# Patient Record
Sex: FEMALE | Race: WHITE | URBAN - METROPOLITAN AREA
[De-identification: names, ages, dates, MRNs, and addresses within clinical notes are randomized per-mention and may not be internally consistent; named-entity substitution may affect disease eponyms.]

---

## 2024-03-06 ENCOUNTER — LAB REQUISITION (OUTPATIENT)
Dept: LAB | Facility: CLINIC | Age: 64
End: 2024-03-06
Payer: COMMERCIAL

## 2024-03-06 DIAGNOSIS — Z11.1 ENCOUNTER FOR SCREENING FOR RESPIRATORY TUBERCULOSIS: ICD-10-CM

## 2024-03-07 ENCOUNTER — LAB REQUISITION (OUTPATIENT)
Dept: LAB | Facility: CLINIC | Age: 64
End: 2024-03-07
Payer: COMMERCIAL

## 2024-03-07 ENCOUNTER — TRANSITIONAL CARE UNIT VISIT (OUTPATIENT)
Dept: GERIATRICS | Facility: CLINIC | Age: 64
End: 2024-03-07
Payer: COMMERCIAL

## 2024-03-07 ENCOUNTER — TELEPHONE (OUTPATIENT)
Dept: GERIATRICS | Facility: CLINIC | Age: 64
End: 2024-03-07

## 2024-03-07 ENCOUNTER — DOCUMENTATION ONLY (OUTPATIENT)
Dept: GERIATRICS | Facility: CLINIC | Age: 64
End: 2024-03-07
Payer: COMMERCIAL

## 2024-03-07 VITALS
DIASTOLIC BLOOD PRESSURE: 65 MMHG | OXYGEN SATURATION: 96 % | SYSTOLIC BLOOD PRESSURE: 139 MMHG | RESPIRATION RATE: 22 BRPM | TEMPERATURE: 97.8 F | HEART RATE: 63 BPM | WEIGHT: 293 LBS | BODY MASS INDEX: 53.92 KG/M2 | HEIGHT: 62 IN

## 2024-03-07 DIAGNOSIS — E66.01 MORBID OBESITY (H): ICD-10-CM

## 2024-03-07 DIAGNOSIS — D62 ANEMIA DUE TO BLOOD LOSS, ACUTE: ICD-10-CM

## 2024-03-07 DIAGNOSIS — D64.9 ANEMIA, UNSPECIFIED: ICD-10-CM

## 2024-03-07 DIAGNOSIS — I10 PRIMARY HYPERTENSION: ICD-10-CM

## 2024-03-07 DIAGNOSIS — M81.0 AGE-RELATED OSTEOPOROSIS WITHOUT CURRENT PATHOLOGICAL FRACTURE: ICD-10-CM

## 2024-03-07 DIAGNOSIS — I27.82 OTHER CHRONIC PULMONARY EMBOLISM, UNSPECIFIED WHETHER ACUTE COR PULMONALE PRESENT (H): ICD-10-CM

## 2024-03-07 DIAGNOSIS — I89.0 LYMPHEDEMA: ICD-10-CM

## 2024-03-07 DIAGNOSIS — S81.002S OPEN WOUND OF LEFT KNEE, SEQUELA: Primary | ICD-10-CM

## 2024-03-07 DIAGNOSIS — E03.9 HYPOTHYROIDISM, UNSPECIFIED TYPE: ICD-10-CM

## 2024-03-07 DIAGNOSIS — Z86.718 HISTORY OF DVT (DEEP VEIN THROMBOSIS): ICD-10-CM

## 2024-03-07 DIAGNOSIS — I10 ESSENTIAL (PRIMARY) HYPERTENSION: ICD-10-CM

## 2024-03-07 DIAGNOSIS — R53.81 PHYSICAL DECONDITIONING: ICD-10-CM

## 2024-03-07 PROBLEM — M25.561 BILATERAL CHRONIC KNEE PAIN: Status: ACTIVE | Noted: 2021-08-22

## 2024-03-07 PROBLEM — R06.83 SNORING: Status: ACTIVE | Noted: 2023-08-20

## 2024-03-07 PROBLEM — M25.562 BILATERAL CHRONIC KNEE PAIN: Status: ACTIVE | Noted: 2021-08-22

## 2024-03-07 PROBLEM — M79.641 BILATERAL HAND PAIN: Status: ACTIVE | Noted: 2019-08-20

## 2024-03-07 PROBLEM — H43.813 VITREOUS DETACHMENT OF BOTH EYES: Status: ACTIVE | Noted: 2019-10-07

## 2024-03-07 PROBLEM — G89.29 BILATERAL CHRONIC KNEE PAIN: Status: ACTIVE | Noted: 2021-08-22

## 2024-03-07 PROBLEM — M65.30 TRIGGER FINGER OF RIGHT HAND: Status: ACTIVE | Noted: 2023-03-17

## 2024-03-07 PROBLEM — M62.838 SPASM OF MUSCLE: Status: ACTIVE | Noted: 2021-08-22

## 2024-03-07 PROBLEM — M79.642 BILATERAL HAND PAIN: Status: ACTIVE | Noted: 2019-08-20

## 2024-03-07 PROBLEM — R31.9 HEMATURIA: Status: ACTIVE | Noted: 2023-07-05

## 2024-03-07 PROBLEM — Z64.1 MULTIPARITY: Status: ACTIVE | Noted: 2024-03-07

## 2024-03-07 PROBLEM — Z79.01 LONG TERM CURRENT USE OF ANTICOAGULANT: Status: ACTIVE | Noted: 2023-07-24

## 2024-03-07 PROBLEM — I26.02 ACUTE SADDLE PULMONARY EMBOLISM WITH ACUTE COR PULMONALE (H): Status: ACTIVE | Noted: 2023-06-07

## 2024-03-07 PROBLEM — N17.9 AKI (ACUTE KIDNEY INJURY) (H): Status: ACTIVE | Noted: 2023-06-08

## 2024-03-07 PROBLEM — S81.002A OPEN KNEE WOUND, LEFT, INITIAL ENCOUNTER: Status: ACTIVE | Noted: 2024-02-27

## 2024-03-07 PROBLEM — E03.1 CONGENITAL HYPOTHYROIDISM WITHOUT GOITER: Status: ACTIVE | Noted: 2017-08-21

## 2024-03-07 PROBLEM — Z86.711 HISTORY OF PULMONARY EMBOLISM: Status: ACTIVE | Noted: 2024-03-07

## 2024-03-07 PROBLEM — R42 DIZZINESS: Status: ACTIVE | Noted: 2021-08-22

## 2024-03-07 PROBLEM — R73.03 PREDIABETES: Status: ACTIVE | Noted: 2023-06-07

## 2024-03-07 PROCEDURE — 36415 COLL VENOUS BLD VENIPUNCTURE: CPT | Performed by: NURSE PRACTITIONER

## 2024-03-07 PROCEDURE — 99309 SBSQ NF CARE MODERATE MDM 30: CPT | Performed by: NURSE PRACTITIONER

## 2024-03-07 PROCEDURE — 80048 BASIC METABOLIC PNL TOTAL CA: CPT | Performed by: NURSE PRACTITIONER

## 2024-03-07 PROCEDURE — P9604 ONE-WAY ALLOW PRORATED TRIP: HCPCS | Performed by: NURSE PRACTITIONER

## 2024-03-07 PROCEDURE — 82306 VITAMIN D 25 HYDROXY: CPT | Performed by: NURSE PRACTITIONER

## 2024-03-07 PROCEDURE — 36415 COLL VENOUS BLD VENIPUNCTURE: CPT | Mod: ORL | Performed by: NURSE PRACTITIONER

## 2024-03-07 PROCEDURE — 85027 COMPLETE CBC AUTOMATED: CPT | Performed by: NURSE PRACTITIONER

## 2024-03-07 PROCEDURE — P9604 ONE-WAY ALLOW PRORATED TRIP: HCPCS | Mod: ORL | Performed by: NURSE PRACTITIONER

## 2024-03-07 PROCEDURE — 86481 TB AG RESPONSE T-CELL SUSP: CPT | Mod: ORL | Performed by: NURSE PRACTITIONER

## 2024-03-07 RX ORDER — HYDROMORPHONE HYDROCHLORIDE 2 MG/1
2-4 TABLET ORAL EVERY 4 HOURS PRN
COMMUNITY
End: 2024-03-25

## 2024-03-07 RX ORDER — LEVOTHYROXINE SODIUM 125 UG/1
125 TABLET ORAL DAILY
COMMUNITY

## 2024-03-07 RX ORDER — ACETAMINOPHEN 500 MG
1000 TABLET ORAL EVERY 6 HOURS PRN
COMMUNITY

## 2024-03-07 RX ORDER — SENNOSIDES 8.6 MG
1 TABLET ORAL 2 TIMES DAILY
COMMUNITY

## 2024-03-07 RX ORDER — LEVOFLOXACIN 750 MG/1
750 TABLET, FILM COATED ORAL DAILY
COMMUNITY
End: 2024-03-25

## 2024-03-07 RX ORDER — SODIUM PHOSPHATE,MONO-DIBASIC 19G-7G/118
1 ENEMA (ML) RECTAL DAILY
COMMUNITY

## 2024-03-07 RX ORDER — POLYETHYLENE GLYCOL 3350 17 G/17G
17 POWDER, FOR SOLUTION ORAL DAILY
COMMUNITY

## 2024-03-07 RX ORDER — BENAZEPRIL HYDROCHLORIDE AND HYDROCHLOROTHIAZIDE 20; 12.5 MG/1; MG/1
1 TABLET ORAL DAILY
COMMUNITY
End: 2024-03-07

## 2024-03-07 NOTE — TELEPHONE ENCOUNTER
[facility] Triage Nurse Telephone Encounter    Provider: AGUSTIN Duque CNP   Facility: [facility]  Facility Type:  TCU    Caller: Kriss  Call Back Number: [phone]    Allergies:  Not on File     Reason for call: Pt has order for Benazepril-hydrochlorothiazide 10-12.5mg tab daily and pharmacy does not have it in stock. Per hospital notes it was being held.     Verbal Order/Direction given by Provider:   - Hold for now while in TCU. Provider to address.    Provider giving Order:  AGUSTIN Duque CNP     Verbal Order given to: Kriss Cordon RN

## 2024-03-07 NOTE — PROGRESS NOTES
[facility] GERIATRICS    PRIMARY CARE PROVIDER AND CLINIC:  No primary care provider on file., No primary physician on file.  Chief Complaint   Patient presents with    Hospital F/U      [place] Medical Record Number:  [id number]  Place of Service where encounter took place:  [facility] ([facility]) [[id number]]    Laura Zamudio  is a 63 year old  (1960) with a medical history of DVT and PE, HTN and morbid obesity. She fell in January after being pulled down by her dog.  She developed a large hematoma on the left knee which became worse and eventually had developed necrotic skin.  She has been going to the wound clinic every week but not healing well in fact appeared to be worsening.  She was admitted to [facility] where she underwent an I&D with skin graft placement.  She had a wound VAC on several days after surgery but this was removed for discharge.  Recommendations for nonweightbearing on the left lower extremity therefore she admitted to [facility] as she was unable to return home as she had 15 stairs to get up to the bathroom. She was admitted to the above facility from  [facility] . Hospital stay 2/27/2024 through 3/6/2024..     Laura was visited today while in room resting in bed.  She denies any pain to her left.  She has baseline lymphedema and states that her feet are looking a lot better than they have in the past.  She has lymphedema wraps on her right leg and currently has a wrap with the knee immobilizer on her left leg.  She is having loose stools therefore has been declining the senna.  Appetite is good.  No pain with urination.  She is sleeping well.    She currently lives in her home independently where she has 15 stairs to get up to use the restroom.  She plans to stay at the [facility] until her weight restriction is lifted and she is able to do the stairs in her home.  She has 2 children, daughter lives locally and 1 son who is a pharmacy resident in  "[place].  She has been working up until her accident and is currently out on leave.    CODE STATUS/ADVANCE DIRECTIVES DISCUSSION:  CPR/Full code   ALLERGIES: No Known Allergies   PAST MEDICAL HISTORY: No past medical history on file.   PAST SURGICAL HISTORY:   has no past surgical history on file.  FAMILY HISTORY: family history is not on file.  SOCIAL HISTORY:     Patient's living condition: lives alone    Post Discharge Medication Reconciliation Status:   MED REC REQUIRED  Post Medication Reconciliation Status: discharge medications reconciled, continue medications without change       Current Outpatient Medications   Medication Sig    acetaminophen (TYLENOL) 500 MG tablet Take 1,000 mg by mouth every 6 hours as needed for mild pain    glucosamine-chondroitin 500-400 MG CAPS per capsule Take 1 capsule by mouth daily    HYDROmorphone (DILAUDID) 2 MG tablet Take 2-4 mg by mouth every 4 hours as needed for severe pain    levofloxacin (LEVAQUIN) 750 MG tablet Take 750 mg by mouth daily    levothyroxine (SYNTHROID/LEVOTHROID) 125 MCG tablet Take 125 mcg by mouth daily    polyethylene glycol (MIRALAX) 17 g packet Take 17 g by mouth daily    rivaroxaban ANTICOAGULANT (XARELTO) 10 MG TABS tablet Take 10 mg by mouth daily    [START ON 3/11/2024] rivaroxaban ANTICOAGULANT (XARELTO) 20 MG TABS tablet Take 20 mg by mouth daily    sennosides (SENOKOT) 8.6 MG tablet Take 1 tablet by mouth 2 times daily     No current facility-administered medications for this visit.       ROS:  10 point ROS of systems including Constitutional, Eyes, Respiratory, Cardiovascular, Gastroenterology, Genitourinary, Integumentary, Musculoskeletal, Psychiatric were all negative except for pertinent positives noted in my HPI.    Vitals:  /65   Pulse 63   Temp 97.8  F (36.6  C)   Resp 22   Ht 1.575 m (5' 2\")   Wt 137.9 kg (304 lb)   SpO2 96%   BMI 55.60 kg/m    Exam:  GENERAL APPEARANCE:  Alert, in no distress, pleasant, cooperative, " oriented x 4  EYES: no discharge or mattering on lids or lashes noted  ENT:  moist mucous membranes, hearing acuity intact  NECK: supple, symmetrical  RESP: no respiratory distress, Lung sounds clear, patient is on room air  CV:  rate and rhythm regular, no murmur. Edema 1-2+ in bilateral lower extremities, lymph wrap in place to right leg, left leg with wrap and immobolizer. VASCULAR: warm extremities without open areas.  ABDOMEN: obese, normal bowel sounds, soft, nontender.  M/S:   Gait and station NWB LLE, no tenderness or swelling of the joints; able to move all extremities   SKIN:  Inspection and palpation of skin and subcutaneous tissue: skin warm, dry and intact without rashes. UTV left knee wound  NEURO: no facial asymmetry, no speech deficits and able to follow directions, moves all extremities symmetrically  PSYCH:  insight and judgement intact, memory intact, affect and mood normal      Lab/Diagnostic data:  Recent labs in [facility] reviewed by me today.     ASSESSMENT/PLAN:  Open knee wound, left  Sustained after falling while walking her dog.  Now status post I&D with skin grafting.  No longer has a wound VAC in place.  She does have an Ace wrap with a knee immobilizer on.  Thankfully not having any complaints of pain.  There are no instructions for dressing change but Laura reports that the shared with her that the dressing is not supposed to be changed until Ortho follow-up.  Was unable to visualize the wound today as it was wrapped with the immobilizer in place.  -- Will continue with Tylenol as needed  -- She has Dilaudid as needed which she is not using.  Will use current supply then switch over to oxycodone given the Dilaudid shortage  -- Nonweightbearing to the left lower extremity  -- Levaquin 750 mg daily until ortho appt which is scheduled for 3/22/24.  -- Will need to call the Ortho clinic for orders on dressing changes.     Pulmonary Embolism  DVT  This past summer, unprovoked. Has been on  xaretlo since that time.   --continue with xarelto.     Morbid obesity  BMI of 50.0-59.9, adult   With associated hypertension and osteoarthritis  304 lbs 0 oz Body mass index is 55.6 kg/m .  -- Encourage healthy meals and snacks  -- Dietitian to follow here at the TCU    Lymphedema of both lower extremities  Appears better controlled per Laura.   --will start low dose lasix if edema gets worse.    Essential hypertension  Systolic blood pressures have been in the 130s to 140s.  She is on Lotensin which she was not taking during the hospital stay and unfortunately pharmacy does not have it in stock so she has not started here at the TCU.  Given that her blood pressures have been under control we will hold off on further antihypertensives at this time but could consider low-dose Lasix if she needs diuresis for lisinopril.  --Continue to monitor blood pressure and heart rate, adjust medications as needed.    Acute blood loss anemia   10.3-->8.2  --recheck CBC on 3/7/24    Hypothyroidism   -- Continue with levothyroxine    Physical deconditioning  Secondary to recent hospitalization and underlying medical conditions.   --ongoing PT/OT for strengthening.     Total time spent with patient visit at the skilled nursing facility was 40 minutes including patient visit and review of past records.     Electronically signed by:  AGUSTIN Lincoln CNP

## 2024-03-07 NOTE — LETTER
3/7/2024        RE: Laura Zamudio  [address] [facility] [address]        [facility] GERIATRICS    PRIMARY CARE PROVIDER AND CLINIC:  No primary care provider on file., No primary physician on file.  Chief Complaint   Patient presents with     Hospital F/U      [place] Medical Record Number:  [id number]  Place of Service where encounter took place:  [facility] ([facility]) [[id number]]    Laura Zamudio  is a 63 year old  (1960) with a medical history of DVT and PE, HTN and morbid obesity. She fell in January after being pulled down by her dog.  She developed a large hematoma on the left knee which became worse and eventually had developed necrotic skin.  She has been going to the wound clinic every week but not healing well in fact appeared to be worsening.  She was admitted to [facility] where she underwent an I&D with skin graft placement.  She had a wound VAC on several days after surgery but this was removed for discharge.  Recommendations for nonweightbearing on the left lower extremity therefore she admitted to [facility] as she was unable to return home as she had 15 stairs to get up to the bathroom. She was admitted to the above facility from  [facility] . Hospital stay 2/27/2024 through 3/6/2024..     Laura was visited today while in room resting in bed.  She denies any pain to her left.  She has baseline lymphedema and states that her feet are looking a lot better than they have in the past.  She has lymphedema wraps on her right leg and currently has a wrap with the knee immobilizer on her left leg.  She is having loose stools therefore has been declining the senna.  Appetite is good.  No pain with urination.  She is sleeping well.    She currently lives in her home independently where she has 15 stairs to get up to use the restroom.  She plans to [facility] the [facility] until her weight restriction is lifted and she is able to do the stairs in her  "home.  She has 2 children, daughter lives locally and 1 son who is a pharmacy resident in [place].  She has been working up until her accident and is currently out on leave.    CODE STATUS/ADVANCE DIRECTIVES DISCUSSION:  CPR/Full code   ALLERGIES: No Known Allergies   PAST MEDICAL HISTORY: No past medical history on file.   PAST SURGICAL HISTORY:   has no past surgical history on file.  FAMILY HISTORY: family history is not on file.  SOCIAL HISTORY:     Patient's living condition: lives alone    Post Discharge Medication Reconciliation Status:   MED REC REQUIRED  Post Medication Reconciliation Status: discharge medications reconciled, continue medications without change       Current Outpatient Medications   Medication Sig     acetaminophen (TYLENOL) 500 MG tablet Take 1,000 mg by mouth every 6 hours as needed for mild pain     glucosamine-chondroitin 500-400 MG CAPS per capsule Take 1 capsule by mouth daily     HYDROmorphone (DILAUDID) 2 MG tablet Take 2-4 mg by mouth every 4 hours as needed for severe pain     levofloxacin (LEVAQUIN) 750 MG tablet Take 750 mg by mouth daily     levothyroxine (SYNTHROID/LEVOTHROID) 125 MCG tablet Take 125 mcg by mouth daily     polyethylene glycol (MIRALAX) 17 g packet Take 17 g by mouth daily     rivaroxaban ANTICOAGULANT (XARELTO) 10 MG TABS tablet Take 10 mg by mouth daily     [START ON 3/11/2024] rivaroxaban ANTICOAGULANT (XARELTO) 20 MG TABS tablet Take 20 mg by mouth daily     sennosides (SENOKOT) 8.6 MG tablet Take 1 tablet by mouth 2 times daily     No current facility-administered medications for this visit.       ROS:  10 point ROS of systems including Constitutional, Eyes, Respiratory, Cardiovascular, Gastroenterology, Genitourinary, Integumentary, Musculoskeletal, Psychiatric were all negative except for pertinent positives noted in my HPI.    Vitals:  /65   Pulse 63   Temp 97.8  F (36.6  C)   Resp 22   Ht 1.575 m (5' 2\")   Wt 137.9 kg (304 lb)   SpO2 96%   " BMI 55.60 kg/m    Exam:  GENERAL APPEARANCE:  Alert, in no distress, pleasant, cooperative, oriented x 4  EYES: no discharge or mattering on lids or lashes noted  ENT:  moist mucous membranes, hearing acuity intact  NECK: supple, symmetrical  RESP: no respiratory distress, Lung sounds clear, patient is on room air  CV:  rate and rhythm regular, no murmur. Edema 1-2+ in bilateral lower extremities, lymph wrap in place to right leg, left leg with wrap and immobolizer. VASCULAR: warm extremities without open areas.  ABDOMEN: obese, normal bowel sounds, soft, nontender.  M/S:   Gait and station NWB LLE, no tenderness or swelling of the joints; able to move all extremities   SKIN:  Inspection and palpation of skin and subcutaneous tissue: skin warm, dry and intact without rashes. UTV left knee wound  NEURO: no facial asymmetry, no speech deficits and able to follow directions, moves all extremities symmetrically  PSYCH:  insight and judgement intact, memory intact, affect and mood normal      Lab/Diagnostic data:  Recent labs in [facility] reviewed by me today.     ASSESSMENT/PLAN:  Open knee wound, left  Sustained after falling while walking her dog.  Now status post I&D with skin grafting.  No longer has a wound VAC in place.  She does have an Ace wrap with a knee immobilizer on.  Thankfully not having any complaints of pain.  There are no instructions for dressing change but Laura reports that the shared with her that the dressing is not supposed to be changed until Ortho follow-up.  Was unable to visualize the wound today as it was wrapped with the immobilizer in place.  -- Will continue with Tylenol as needed  -- She has Dilaudid as needed which she is not using.  Will use current supply then switch over to oxycodone given the Dilaudid shortage  -- Nonweightbearing to the left lower extremity  -- Levaquin 750 mg daily until ortho appt which is scheduled for 3/22/24.  -- Will need to call the Ortho clinic for orders on  dressing changes.     Pulmonary Embolism  DVT  This past summer, unprovoked. Has been on xaretlo since that time.   --continue with xarelto.     Morbid obesity  BMI of 50.0-59.9, adult   With associated hypertension and osteoarthritis  304 lbs 0 oz Body mass index is 55.6 kg/m .  -- Encourage healthy meals and snacks  -- Dietitian to follow here at the TCU    Lymphedema of both lower extremities  Appears better controlled per Laura.   --will start low dose lasix if edema gets worse.    Essential hypertension  Systolic blood pressures have been in the 130s to 140s.  She is on Lotensin which she was not taking during the hospital stay and unfortunately pharmacy does not have it in stock so she has not started here at the TCU.  Given that her blood pressures have been under control we will hold off on further antihypertensives at this time but could consider low-dose Lasix if she needs diuresis for lisinopril.  --Continue to monitor blood pressure and heart rate, adjust medications as needed.    Acute blood loss anemia   10.3-->8.2  --recheck CBC on 3/7/24    Hypothyroidism   -- Continue with levothyroxine    Physical deconditioning  Secondary to recent hospitalization and underlying medical conditions.   --ongoing PT/OT for strengthening.     Total time spent with patient visit at the skilled nursing facility was 40 minutes including patient visit and review of past records.     Electronically signed by:  AGUSTIN Lincoln CNP                   Sincerely,        AGUSTIN Lincoln CNP

## 2024-03-08 LAB
ANION GAP SERPL CALCULATED.3IONS-SCNC: 8 MMOL/L (ref 7–15)
BUN SERPL-MCNC: 14.2 MG/DL (ref 8–23)
CALCIUM SERPL-MCNC: 8.7 MG/DL (ref 8.8–10.2)
CHLORIDE SERPL-SCNC: 108 MMOL/L (ref 98–107)
CREAT SERPL-MCNC: 0.68 MG/DL (ref 0.51–0.95)
DEPRECATED HCO3 PLAS-SCNC: 27 MMOL/L (ref 22–29)
EGFRCR SERPLBLD CKD-EPI 2021: >90 ML/MIN/1.73M2
ERYTHROCYTE [DISTWIDTH] IN BLOOD BY AUTOMATED COUNT: 13.9 % (ref 10–15)
GAMMA INTERFERON BACKGROUND BLD IA-ACNC: 0.04 IU/ML
GLUCOSE SERPL-MCNC: 95 MG/DL (ref 70–99)
HCT VFR BLD AUTO: 27.9 % (ref 35–47)
HGB BLD-MCNC: 8.4 G/DL (ref 11.7–15.7)
M TB IFN-G BLD-IMP: NEGATIVE
M TB IFN-G CD4+ BCKGRND COR BLD-ACNC: 4.28 IU/ML
MCH RBC QN AUTO: 29.9 PG (ref 26.5–33)
MCHC RBC AUTO-ENTMCNC: 30.1 G/DL (ref 31.5–36.5)
MCV RBC AUTO: 99 FL (ref 78–100)
MITOGEN IGNF BCKGRD COR BLD-ACNC: 0 IU/ML
MITOGEN IGNF BCKGRD COR BLD-ACNC: 0 IU/ML
PLATELET # BLD AUTO: 373 10E3/UL (ref 150–450)
POTASSIUM SERPL-SCNC: 3.8 MMOL/L (ref 3.4–5.3)
QUANTIFERON MITOGEN: 4.32 IU/ML
QUANTIFERON NIL TUBE: 0.04 IU/ML
QUANTIFERON TB1 TUBE: 0.04 IU/ML
QUANTIFERON TB2 TUBE: 0.04
RBC # BLD AUTO: 2.81 10E6/UL (ref 3.8–5.2)
SODIUM SERPL-SCNC: 143 MMOL/L (ref 135–145)
VIT D+METAB SERPL-MCNC: 22 NG/ML (ref 20–50)
WBC # BLD AUTO: 5.6 10E3/UL (ref 4–11)

## 2024-03-11 ENCOUNTER — TRANSITIONAL CARE UNIT VISIT (OUTPATIENT)
Dept: GERIATRICS | Facility: CLINIC | Age: 64
End: 2024-03-11
Payer: COMMERCIAL

## 2024-03-11 VITALS
TEMPERATURE: 97.7 F | DIASTOLIC BLOOD PRESSURE: 72 MMHG | WEIGHT: 293 LBS | HEIGHT: 62 IN | OXYGEN SATURATION: 95 % | RESPIRATION RATE: 20 BRPM | SYSTOLIC BLOOD PRESSURE: 156 MMHG | BODY MASS INDEX: 53.92 KG/M2 | HEART RATE: 66 BPM

## 2024-03-11 DIAGNOSIS — D62 ANEMIA DUE TO BLOOD LOSS, ACUTE: ICD-10-CM

## 2024-03-11 DIAGNOSIS — I89.0 LYMPHEDEMA: ICD-10-CM

## 2024-03-11 DIAGNOSIS — I10 PRIMARY HYPERTENSION: ICD-10-CM

## 2024-03-11 DIAGNOSIS — S81.002S OPEN WOUND OF LEFT KNEE, SEQUELA: Primary | ICD-10-CM

## 2024-03-11 DIAGNOSIS — I27.82 OTHER CHRONIC PULMONARY EMBOLISM, UNSPECIFIED WHETHER ACUTE COR PULMONALE PRESENT (H): ICD-10-CM

## 2024-03-11 DIAGNOSIS — E66.01 MORBID OBESITY (H): ICD-10-CM

## 2024-03-11 DIAGNOSIS — R53.81 PHYSICAL DECONDITIONING: ICD-10-CM

## 2024-03-11 DIAGNOSIS — Z86.718 HISTORY OF DVT (DEEP VEIN THROMBOSIS): ICD-10-CM

## 2024-03-11 PROCEDURE — 99309 SBSQ NF CARE MODERATE MDM 30: CPT | Performed by: NURSE PRACTITIONER

## 2024-03-11 NOTE — LETTER
"    3/11/2024        RE: Laura Zamudio  [address] [facility] [address]        [facility] GERIATRICS    Chief Complaint   Patient presents with     RECHECK     HPI:  Laura Zamudio is a 63 year old  (1960), who is being seen today for an episodic care visit at: [facility] ([facility]) [[id number]]. Today's concern is:     Laura was visited today while in her room resting in bed.  She reports that she has some gurgling and crackling in her upper chest area, she has had this at home in the past.  Nursing gave her an incentive spirometer and this has helped with that.  She does feel little bit more short of breath when she is exerting herself but feels this is due to the deconditioning.  She has no complaints of pain in the left leg.  Has used Dilaudid x 1.  Feels that her lower extremity edema is controlled and cannot say if she feels that there is fluid in her abdomen.  Urinating without discomfort.  Eating well.  Having regular bowel movements.  Sleeping well.    Allergies, and PMH/PSH reviewed in [facility] today.  REVIEW OF SYSTEMS:  10 point ROS of systems including Constitutional, Eyes, Respiratory, Cardiovascular, Gastroenterology, Genitourinary, Integumentary, Musculoskeletal, Psychiatric were all negative except for pertinent positives noted in my HPI.    Objective:   BP (!) 156/72   Pulse 66   Temp 97.7  F (36.5  C)   Resp 20   Ht 1.575 m (5' 2\")   Wt 143 kg (315 lb 3.2 oz)   SpO2 95%   BMI 57.65 kg/m    GENERAL APPEARANCE:  Alert, in no distress, pleasant, cooperative, oriented x 4  EYES: no discharge or mattering on lids or lashes noted  ENT:  moist mucous membranes, hearing acuity intact  NECK: supple, symmetrical  RESP: no respiratory distress, Lung sounds clear, patient is on room air  CV:  rate and rhythm regular, no murmur. Edema 1-2+ in bilateral lower extremities, lymph wrap in place to right leg, left leg with wrap and immobolizer. VASCULAR: warm extremities " without open areas.  ABDOMEN: obese, normal bowel sounds, soft, nontender.  M/S:   Gait and station NWB LLE, no tenderness or swelling of the joints; able to move all extremities   SKIN:  Inspection and palpation of skin and subcutaneous tissue: skin warm, dry and intact without rashes. UTV left knee wound  NEURO: no facial asymmetry, no speech deficits and able to follow directions, moves all extremities symmetrically  PSYCH:  insight and judgement intact, memory intact, affect and mood normal    CBC RESULTS:   Recent Labs   Lab Test 03/07/24  0620   WBC 5.6   RBC 2.81*   HGB 8.4*   HCT 27.9*   MCV 99   MCH 29.9   MCHC 30.1*   RDW 13.9          Last Basic Metabolic Panel:  Recent Labs   Lab Test 03/07/24  0620      POTASSIUM 3.8   CHLORIDE 108*   VIVIENNE 8.7*   CO2 27   BUN 14.2   CR 0.68   GLC 95       Assessment/Plan:  Open knee wound, left  Sustained after falling while walking her dog.  Now status post I&D with skin grafting.  No longer has a wound VAC in place.  She does have an Ace wrap with a knee immobilizer on.  Thankfully not having any complaints of pain.    -- Will continue with Tylenol as needed  -- She has Dilaudid as needed which she has used x 1. Will use current supply then switch over to oxycodone given the Dilaudid shortage  -- Nonweightbearing to the left lower extremity  -- Levaquin 750 mg daily until ortho appt which is scheduled for 3/22/24.  -- Will need to call the Ortho clinic for orders on dressing changes.     Pulmonary Embolism  DVT  This past summer, unprovoked. Has been on xaretlo since that time.   --continue with xarelto.     Morbid obesity  BMI of 50.0-59.9, adult   With associated hypertension and osteoarthritis  304 lbs 0 oz Body mass index is 55.6 kg/m .  -- Encourage healthy meals and snacks  -- Dietitian to follow here at the TCU    Lymphedema of both lower extremities  Appears better controlled per Laura.   --will start low dose lasix x 3 days    Essential  hypertension  Systolic blood pressures have been in the 140-150s. She is on Lotensin which she was not taking during the hospital stay and unfortunately pharmacy does not have it in stock so she has not started here at the TCU. Her weights have been increasing although her lower extremity edema looks improved per her report.  Wonder if she has some fluid accumulating in her abdomen.  We discussed the use of Lasix today which she is on board with trying but would like to do 10 mg rather than 20 every day  --Will do Lasix 10 mg daily x 3  --Continue to monitor blood pressure and heart rate, adjust medications as needed.    Acute blood loss anemia   10.3-->8.2 and was 8.4 here at the TCU  --consider starting iron supplementation but suspect this will trend back upwards.     Hypothyroidism   -- Continue with levothyroxine    Physical deconditioning  Secondary to recent hospitalization and underlying medical conditions.   --ongoing PT/OT for strengthening.     MED REC REQUIRED  Post Medication Reconciliation Status: discharge medications reconciled and changed, per note/orders    Electronically signed by: AGUSTIN Lincoln CNP       Wheelchair Documentation  Size: 28 w x 19 D  Corresponding cushion: Yes: seat cushioni  Standard foot rests: Yes  Elevating leg rests: Yes left leg elevation needed.   Arm rests: Yes: standard  Lap tray: No  Dose the patient use oxygen? No   Is the patient able to propel wheelchair? Yes   1. The patient has mobility limitations that impairs their ability to participate in one or more mobility related activities: Grooming and Bathing.  The wheelchair is suitable and necessary for use in the patient's home.  2. The patient's mobility limitations cannot be safely resolved by using a cane/walker:Yes  due to current hematoma and infection in left leg, NWB  Reason why a cane or walker will not meet the patient's needs.Currently NWB on LLE   3. The patients home has adequate access to use a  manual wheelchair:Yes  4. The use of a manual wheelchair on a regular basis will improve the patients ability to participate in mobility related ADL's at home:Yes  5. The patient is willing to use a manual wheelchair at home:Yes  6. The patient has adequate upper body strength and the mental capability to safely use a manual wheelchair and/or has a caregiver that is able to assist: Yes  7. Does the patient have a lower extremity injury or edema?Yes  Reason for Type of Wheelchair  Patient weight: 315 lbs 0 oz  Extra Heavy Duty Wheelchair: Patient is over the 300lb weight limit for other wheelchairs.    AGUSTIN Lincoln CNP            Sincerely,        AGUSTIN Lincoln CNP

## 2024-03-11 NOTE — PROGRESS NOTES
"[facility] GERIATRICS    Chief Complaint   Patient presents with    RECHECK     HPI:  Laura Zamudio is a 63 year old  (1960), who is being seen today for an episodic care visit at: [facility] ([facility]) [[id number]]. Today's concern is:     Laura was visited today while in her room resting in bed.  She reports that she has some gurgling and crackling in her upper chest area, she has had this at home in the past.  Nursing gave her an incentive spirometer and this has helped with that.  She does feel little bit more short of breath when she is exerting herself but feels this is due to the deconditioning.  She has no complaints of pain in the left leg.  Has used Dilaudid x 1.  Feels that her lower extremity edema is controlled and cannot say if she feels that there is fluid in her abdomen.  Urinating without discomfort.  Eating well.  Having regular bowel movements.  Sleeping well.    Allergies, and PMH/PSH reviewed in EPIC today.  REVIEW OF SYSTEMS:  10 point ROS of systems including Constitutional, Eyes, Respiratory, Cardiovascular, Gastroenterology, Genitourinary, Integumentary, Musculoskeletal, Psychiatric were all negative except for pertinent positives noted in my HPI.    Objective:   BP (!) 156/72   Pulse 66   Temp 97.7  F (36.5  C)   Resp 20   Ht 1.575 m (5' 2\")   Wt 143 kg (315 lb 3.2 oz)   SpO2 95%   BMI 57.65 kg/m    GENERAL APPEARANCE:  Alert, in no distress, pleasant, cooperative, oriented x 4  EYES: no discharge or mattering on lids or lashes noted  ENT:  moist mucous membranes, hearing acuity intact  NECK: supple, symmetrical  RESP: no respiratory distress, Lung sounds clear, patient is on room air  CV:  rate and rhythm regular, no murmur. Edema 1-2+ in bilateral lower extremities, lymph wrap in place to right leg, left leg with wrap and immobolizer. VASCULAR: warm extremities without open areas.  ABDOMEN: obese, normal bowel sounds, soft, nontender.  M/S:   Gait and station " NWB LLE, no tenderness or swelling of the joints; able to move all extremities   SKIN:  Inspection and palpation of skin and subcutaneous tissue: skin warm, dry and intact without rashes. UTV left knee wound  NEURO: no facial asymmetry, no speech deficits and able to follow directions, moves all extremities symmetrically  PSYCH:  insight and judgement intact, memory intact, affect and mood normal    CBC RESULTS:   Recent Labs   Lab Test 03/07/24  0620   WBC 5.6   RBC 2.81*   HGB 8.4*   HCT 27.9*   MCV 99   MCH 29.9   MCHC 30.1*   RDW 13.9          Last Basic Metabolic Panel:  Recent Labs   Lab Test 03/07/24  0620      POTASSIUM 3.8   CHLORIDE 108*   VIVIENNE 8.7*   CO2 27   BUN 14.2   CR 0.68   GLC 95       Assessment/Plan:  Open knee wound, left  Sustained after falling while walking her dog.  Now status post I&D with skin grafting.  No longer has a wound VAC in place.  She does have an Ace wrap with a knee immobilizer on.  Thankfully not having any complaints of pain.    -- Will continue with Tylenol as needed  -- She has Dilaudid as needed which she has used x 1. Will use current supply then switch over to oxycodone given the Dilaudid shortage  -- Nonweightbearing to the left lower extremity  -- Levaquin 750 mg daily until ortho appt which is scheduled for 3/22/24.  -- Will need to call the Ortho clinic for orders on dressing changes.     Pulmonary Embolism  DVT  This past summer, unprovoked. Has been on xaretlo since that time.   --continue with xarelto.     Morbid obesity  BMI of 50.0-59.9, adult   With associated hypertension and osteoarthritis  304 lbs 0 oz Body mass index is 55.6 kg/m .  -- Encourage healthy meals and snacks  -- Dietitian to follow here at the TCU    Lymphedema of both lower extremities  Appears better controlled per Laura.   --will start low dose lasix x 3 days    Essential hypertension  Systolic blood pressures have been in the 140-150s. She is on Lotensin which she was not taking  during the hospital stay and unfortunately pharmacy does not have it in stock so she has not started here at the TCU. Her weights have been increasing although her lower extremity edema looks improved per her report.  Wonder if she has some fluid accumulating in her abdomen.  We discussed the use of Lasix today which she is on board with trying but would like to do 10 mg rather than 20 every day  --Will do Lasix 10 mg daily x 3  --Continue to monitor blood pressure and heart rate, adjust medications as needed.    Acute blood loss anemia   10.3-->8.2 and was 8.4 here at the TCU  --consider starting iron supplementation but suspect this will trend back upwards.     Hypothyroidism   -- Continue with levothyroxine    Physical deconditioning  Secondary to recent hospitalization and underlying medical conditions.   --ongoing PT/OT for strengthening.     MED REC REQUIRED  Post Medication Reconciliation Status: discharge medications reconciled and changed, per note/orders    Electronically signed by: AGUSTIN Lincoln CNP       Wheelchair Documentation  Size: 28 w x 19 D  Corresponding cushion: Yes: seat cushioni  Standard foot rests: Yes  Elevating leg rests: Yes left leg elevation needed.   Arm rests: Yes: standard  Lap tray: No  Dose the patient use oxygen? No   Is the patient able to propel wheelchair? Yes   1. The patient has mobility limitations that impairs their ability to participate in one or more mobility related activities: Grooming and Bathing.  The wheelchair is suitable and necessary for use in the patient's home.  2. The patient's mobility limitations cannot be safely resolved by using a cane/walker:Yes  due to current hematoma and infection in left leg, NWB  Reason why a cane or walker will not meet the patient's needs.Currently NWB on LLE   3. The patients home has adequate access to use a manual wheelchair:Yes  4. The use of a manual wheelchair on a regular basis will improve the patients ability to  participate in mobility related ADL's at home:Yes  5. The patient is willing to use a manual wheelchair at home:Yes  6. The patient has adequate upper body strength and the mental capability to safely use a manual wheelchair and/or has a caregiver that is able to assist: Yes  7. Does the patient have a lower extremity injury or edema?Yes  Reason for Type of Wheelchair  Patient weight: 315 lbs 0 oz  Extra Heavy Duty Wheelchair: Patient is over the 300lb weight limit for other wheelchairs.    AGUSTIN Lincoln CNP

## 2024-03-13 NOTE — PROGRESS NOTES
"[facility] GERIATRICS      Chief Complaint   Patient presents with    Hospital F/U      [place] Medical Record Number:  [id number]  Place of Service where encounter took place:  [facility] ([facility])     Laura Zamudio  is a 63 year old  (1960), admitted to the above facility from  [facility] . Hospital stay 2/27/24 through 3/6/24..       Hospital course was reviewed by me, is as per the hospital discharge summary and nurse practitioner note.    Patient has a history of DVT/PE, hypertension and obesity.  She suffered a hematoma to her left knee after she was pulled down by her dog in January and ultimately developed necrotic skin.  In spite of close management at the wound care clinic, she has had worsening of the wound, prompting admission to [facility] where she underwent an I&D and skin graft placement.  A wound VAC was placed after surgery but was removed prior to discharge.  She is nonweightbearing left lower extremity and has been admitted to the TCU as she is unable to navigate 15 stairs in her home to get to the bathroom.  She lives independently.  She was discharged with Levaquin to take until her next orthopedic appointment on 22 March.  She remains on Xarelto given history of unprovoked DVT/PE.    Patient has mild \"spasms\" in her left ankle but otherwise states pain is controlled.  She is mildly short of breath with exertion which she relates to her weight.  She continues to have lower extremity edema and wonders if she could resume compressive stockings that she has at home.  She states she received the okay from her surgeon to utilize these.  She was started on Lasix 10 mg daily 2 days ago.  She does note significant increased urine production but has not noted a change in her extremity edema.    Vital signs have been stable.  Blood pressures have been mildly elevated  Prior to admission Lotensin has not yet been restarted  Hemoglobin most recently 8.4 which is " "stable.          CODE STATUS/ADVANCE DIRECTIVES DISCUSSION:  No Order  CPR/Full code   ALLERGIES: No Known Allergies   PAST MEDICAL HISTORY: As noted above  PAST SURGICAL HISTORY:   has no past surgical history on file.  FAMILY HISTORY: family history is not on file.  SOCIAL HISTORY:     Patient's living condition: lives alone    Current medications were reviewed by me today      Current Outpatient Medications   Medication Sig    acetaminophen (TYLENOL) 500 MG tablet Take 1,000 mg by mouth every 6 hours as needed for mild pain    glucosamine-chondroitin 500-400 MG CAPS per capsule Take 1 capsule by mouth daily    HYDROmorphone (DILAUDID) 2 MG tablet Take 2-4 mg by mouth every 4 hours as needed for severe pain    levofloxacin (LEVAQUIN) 750 MG tablet Take 750 mg by mouth daily    levothyroxine (SYNTHROID/LEVOTHROID) 125 MCG tablet Take 125 mcg by mouth daily    polyethylene glycol (MIRALAX) 17 g packet Take 17 g by mouth daily    rivaroxaban ANTICOAGULANT (XARELTO) 10 MG TABS tablet Take 10 mg by mouth daily    rivaroxaban ANTICOAGULANT (XARELTO) 20 MG TABS tablet Take 20 mg by mouth daily    sennosides (SENOKOT) 8.6 MG tablet Take 1 tablet by mouth 2 times daily     No current facility-administered medications for this visit.       ROS:  10 point ROS of systems including Constitutional, Eyes, Respiratory, Cardiovascular, Gastroenterology, Genitourinary, Integumentary, Musculoskeletal, Psychiatric were all negative except for pertinent positives noted in my HPI.    Vitals:  BP (!) 149/77   Pulse 76   Temp 97.3  F (36.3  C)   Resp 18   Ht 1.575 m (5' 2\")   Wt 143 kg (315 lb 3.2 oz)   SpO2 95%   BMI 57.65 kg/m    Exam:  Very pleasant, obese female, sitting up in bed.  She appears comfortable.  She is fully oriented  Lungs clear  CV regular rhythm  Abdomen soft, protuberant  Right lower extremity 2+ edema  Left leg is in a splint.  2+ left pedal edema.    Lab/Diagnostic data:  Most Recent 3 CBC's:  Recent Labs "   Lab Test 03/07/24  0620   WBC 5.6   HGB 8.4*   MCV 99        Most Recent 3 BMP's:  Recent Labs   Lab Test 03/07/24  0620      POTASSIUM 3.8   CHLORIDE 108*   CO2 27   BUN 14.2   CR 0.68   ANIONGAP 8   VIVIENNE 8.7*   GLC 95     Most Recent 2 LFT's:No lab results found.  Most Recent TSH and T4:No lab results found.  Most Recent Hemoglobin A1c:No lab results found.    ASSESSMENT/PLAN:    Open wound left leg, refractory to outpatient management, now status post I&D with skin grafting.  Immobilizer in place.  Patient is nonweightbearing.  Pain is controlled.  Chronic lower extremity edema may compromise to some degree, healing left leg.  Plan: Continue Levaquin.  Orthopedics appointment 3/20/2024  Edema control with compression stockings and low-dose furosemide  Therapies given nonweightbearing status left lower extremity.  Multiple stairs at home remains a barrier.    History of PE, DVT unprovoked  Plan: Continue chronic Xarelto    Hypertension  Fair control  Not yet back on ARB  Plan: Continue furosemide, increase dose to 20 mg daily for edema control.  This may also have an effect on blood pressure  Monitor BMP.  Ultimately anticipate need to resume ARB    Chronic lower extremity lymphedema  Plan: Continue furosemide, gentle compression stockings monitor      Acute blood loss anemia  Hemoglobin stable, not clearly symptomatic.  Plan: Monitor hemoglobin, symptoms, consider iron therapy      Velasquez Roman MD

## 2024-03-14 ENCOUNTER — TRANSITIONAL CARE UNIT VISIT (OUTPATIENT)
Dept: GERIATRICS | Facility: CLINIC | Age: 64
End: 2024-03-14
Payer: COMMERCIAL

## 2024-03-14 ENCOUNTER — LAB REQUISITION (OUTPATIENT)
Dept: LAB | Facility: CLINIC | Age: 64
End: 2024-03-14
Payer: COMMERCIAL

## 2024-03-14 VITALS
WEIGHT: 293 LBS | SYSTOLIC BLOOD PRESSURE: 149 MMHG | HEIGHT: 62 IN | RESPIRATION RATE: 18 BRPM | BODY MASS INDEX: 53.92 KG/M2 | TEMPERATURE: 97.3 F | OXYGEN SATURATION: 95 % | HEART RATE: 76 BPM | DIASTOLIC BLOOD PRESSURE: 77 MMHG

## 2024-03-14 DIAGNOSIS — I10 PRIMARY HYPERTENSION: ICD-10-CM

## 2024-03-14 DIAGNOSIS — D62 ANEMIA DUE TO BLOOD LOSS, ACUTE: ICD-10-CM

## 2024-03-14 DIAGNOSIS — I89.0 LYMPHEDEMA: ICD-10-CM

## 2024-03-14 DIAGNOSIS — S81.002S OPEN WOUND OF LEFT KNEE, SEQUELA: Primary | ICD-10-CM

## 2024-03-14 DIAGNOSIS — I10 ESSENTIAL (PRIMARY) HYPERTENSION: ICD-10-CM

## 2024-03-14 PROCEDURE — 99305 1ST NF CARE MODERATE MDM 35: CPT | Performed by: INTERNAL MEDICINE

## 2024-03-14 NOTE — LETTER
"    3/14/2024        RE: Laura Zamudio  [address] [facility] [place] [address]        [facility] GERIATRICS      Chief Complaint   Patient presents with     Hospital F/U      [place] Medical Record Number:  [id number]  Place of Service where encounter took place:  [facility] ([facility])     Laura Zamudio  is a 63 year old  (1960), admitted to the above facility from  [facility] . Hospital stay 2/27/24 through 3/6/24..       Hospital course was reviewed by me, is as per the hospital discharge summary and nurse practitioner note.    Patient has a history of DVT/PE, hypertension and obesity.  She suffered a hematoma to her left knee after she was pulled down by her dog in January and ultimately developed necrotic skin.  In spite of close management at the wound care clinic, she has had worsening of the wound, prompting admission to [facility] where she underwent an I&D and skin graft placement.  A wound VAC was placed after surgery but was removed prior to discharge.  She is nonweightbearing left lower extremity and has been admitted to the TCU as she is unable to navigate 15 stairs in her home to get to the bathroom.  She lives independently.  She was discharged with Levaquin to take until her next orthopedic appointment on 22 March.  She remains on Xarelto given history of unprovoked DVT/PE.    Patient has mild \"spasms\" in her left ankle but otherwise states pain is controlled.  She is mildly short of breath with exertion which she relates to her weight.  She continues to have lower extremity edema and wonders if she could resume compressive stockings that she has at home.  She states she received the okay from her surgeon to utilize these.  She was started on Lasix 10 mg daily 2 days ago.  She does note significant increased urine production but has not noted a change in her extremity edema.    Vital signs have been stable.  Blood pressures have been mildly " "elevated  Prior to admission Lotensin has not yet been restarted  Hemoglobin most recently 8.4 which is stable.          CODE STATUS/ADVANCE DIRECTIVES DISCUSSION:  No Order  CPR/Full code   ALLERGIES: No Known Allergies   PAST MEDICAL HISTORY: As noted above  PAST SURGICAL HISTORY:   has no past surgical history on file.  FAMILY HISTORY: family history is not on file.  SOCIAL HISTORY:     Patient's living condition: lives alone    Current medications were reviewed by me today      Current Outpatient Medications   Medication Sig     acetaminophen (TYLENOL) 500 MG tablet Take 1,000 mg by mouth every 6 hours as needed for mild pain     glucosamine-chondroitin 500-400 MG CAPS per capsule Take 1 capsule by mouth daily     HYDROmorphone (DILAUDID) 2 MG tablet Take 2-4 mg by mouth every 4 hours as needed for severe pain     levofloxacin (LEVAQUIN) 750 MG tablet Take 750 mg by mouth daily     levothyroxine (SYNTHROID/LEVOTHROID) 125 MCG tablet Take 125 mcg by mouth daily     polyethylene glycol (MIRALAX) 17 g packet Take 17 g by mouth daily     rivaroxaban ANTICOAGULANT (XARELTO) 10 MG TABS tablet Take 10 mg by mouth daily     rivaroxaban ANTICOAGULANT (XARELTO) 20 MG TABS tablet Take 20 mg by mouth daily     sennosides (SENOKOT) 8.6 MG tablet Take 1 tablet by mouth 2 times daily     No current facility-administered medications for this visit.       ROS:  10 point ROS of systems including Constitutional, Eyes, Respiratory, Cardiovascular, Gastroenterology, Genitourinary, Integumentary, Musculoskeletal, Psychiatric were all negative except for pertinent positives noted in my HPI.    Vitals:  BP (!) 149/77   Pulse 76   Temp 97.3  F (36.3  C)   Resp 18   Ht 1.575 m (5' 2\")   Wt 143 kg (315 lb 3.2 oz)   SpO2 95%   BMI 57.65 kg/m    Exam:  Very pleasant, obese female, sitting up in bed.  She appears comfortable.  She is fully oriented  Lungs clear  CV regular rhythm  Abdomen soft, protuberant  Right lower extremity 2+ " edema  Left leg is in a splint.  2+ left pedal edema.    Lab/Diagnostic data:  Most Recent 3 CBC's:  Recent Labs   Lab Test 03/07/24  0620   WBC 5.6   HGB 8.4*   MCV 99        Most Recent 3 BMP's:  Recent Labs   Lab Test 03/07/24  0620      POTASSIUM 3.8   CHLORIDE 108*   CO2 27   BUN 14.2   CR 0.68   ANIONGAP 8   VIVIENNE 8.7*   GLC 95     Most Recent 2 LFT's:No lab results found.  Most Recent TSH and T4:No lab results found.  Most Recent Hemoglobin A1c:No lab results found.    ASSESSMENT/PLAN:    Open wound left leg, refractory to outpatient management, now status post I&D with skin grafting.  Immobilizer in place.  Patient is nonweightbearing.  Pain is controlled.  Chronic lower extremity edema may compromise to some degree, healing left leg.  Plan: Continue Levaquin.  Orthopedics appointment 3/20/2024  Edema control with compression stockings and low-dose furosemide  Therapies given nonweightbearing status left lower extremity.  Multiple stairs at home remains a barrier.    History of PE, DVT unprovoked  Plan: Continue chronic Xarelto    Hypertension  Fair control  Not yet back on ARB  Plan: Continue furosemide, increase dose to 20 mg daily for edema control.  This may also have an effect on blood pressure  Monitor BMP.  Ultimately anticipate need to resume ARB    Chronic lower extremity lymphedema  Plan: Continue furosemide, gentle compression stockings monitor      Acute blood loss anemia  Hemoglobin stable, not clearly symptomatic.  Plan: Monitor hemoglobin, symptoms, consider iron therapy      Velasquez Roman MD        Sincerely,        Velasquez Roman MD

## 2024-03-15 PROCEDURE — 80048 BASIC METABOLIC PNL TOTAL CA: CPT | Performed by: NURSE PRACTITIONER

## 2024-03-15 PROCEDURE — P9604 ONE-WAY ALLOW PRORATED TRIP: HCPCS | Performed by: NURSE PRACTITIONER

## 2024-03-16 LAB
ANION GAP SERPL CALCULATED.3IONS-SCNC: 12 MMOL/L (ref 7–15)
BUN SERPL-MCNC: 18 MG/DL (ref 8–23)
CALCIUM SERPL-MCNC: 9.1 MG/DL (ref 8.8–10.2)
CHLORIDE SERPL-SCNC: 106 MMOL/L (ref 98–107)
CREAT SERPL-MCNC: 0.71 MG/DL (ref 0.51–0.95)
DEPRECATED HCO3 PLAS-SCNC: 24 MMOL/L (ref 22–29)
EGFRCR SERPLBLD CKD-EPI 2021: >90 ML/MIN/1.73M2
GLUCOSE SERPL-MCNC: 86 MG/DL (ref 70–99)
POTASSIUM SERPL-SCNC: 4.2 MMOL/L (ref 3.4–5.3)
SODIUM SERPL-SCNC: 142 MMOL/L (ref 135–145)

## 2024-03-19 ENCOUNTER — LAB REQUISITION (OUTPATIENT)
Dept: LAB | Facility: CLINIC | Age: 64
End: 2024-03-19
Payer: COMMERCIAL

## 2024-03-19 DIAGNOSIS — D58.2 OTHER HEMOGLOBINOPATHIES (H): ICD-10-CM

## 2024-03-19 DIAGNOSIS — Q82.0 HEREDITARY LYMPHEDEMA: ICD-10-CM

## 2024-03-19 DIAGNOSIS — D64.9 ANEMIA, UNSPECIFIED: ICD-10-CM

## 2024-03-20 LAB
ANION GAP SERPL CALCULATED.3IONS-SCNC: 10 MMOL/L (ref 7–15)
BUN SERPL-MCNC: 19.6 MG/DL (ref 8–23)
CALCIUM SERPL-MCNC: 8.8 MG/DL (ref 8.8–10.2)
CHLORIDE SERPL-SCNC: 106 MMOL/L (ref 98–107)
CREAT SERPL-MCNC: 0.75 MG/DL (ref 0.51–0.95)
DEPRECATED HCO3 PLAS-SCNC: 27 MMOL/L (ref 22–29)
EGFRCR SERPLBLD CKD-EPI 2021: 88 ML/MIN/1.73M2
GLUCOSE SERPL-MCNC: 86 MG/DL (ref 70–99)
HGB BLD-MCNC: 10.3 G/DL (ref 11.7–15.7)
POTASSIUM SERPL-SCNC: 3.5 MMOL/L (ref 3.4–5.3)
SODIUM SERPL-SCNC: 143 MMOL/L (ref 135–145)

## 2024-03-20 PROCEDURE — 80048 BASIC METABOLIC PNL TOTAL CA: CPT | Performed by: NURSE PRACTITIONER

## 2024-03-20 PROCEDURE — 36415 COLL VENOUS BLD VENIPUNCTURE: CPT | Performed by: NURSE PRACTITIONER

## 2024-03-20 PROCEDURE — 85018 HEMOGLOBIN: CPT | Performed by: NURSE PRACTITIONER

## 2024-03-25 ENCOUNTER — DISCHARGE SUMMARY NURSING HOME (OUTPATIENT)
Dept: GERIATRICS | Facility: CLINIC | Age: 64
End: 2024-03-25
Payer: COMMERCIAL

## 2024-03-25 VITALS
HEART RATE: 68 BPM | SYSTOLIC BLOOD PRESSURE: 129 MMHG | RESPIRATION RATE: 20 BRPM | HEIGHT: 62 IN | OXYGEN SATURATION: 96 % | DIASTOLIC BLOOD PRESSURE: 70 MMHG | TEMPERATURE: 97.7 F | BODY MASS INDEX: 53.92 KG/M2 | WEIGHT: 293 LBS

## 2024-03-25 DIAGNOSIS — Z86.718 HISTORY OF DVT (DEEP VEIN THROMBOSIS): ICD-10-CM

## 2024-03-25 DIAGNOSIS — R53.81 PHYSICAL DECONDITIONING: ICD-10-CM

## 2024-03-25 DIAGNOSIS — I27.82 OTHER CHRONIC PULMONARY EMBOLISM, UNSPECIFIED WHETHER ACUTE COR PULMONALE PRESENT (H): ICD-10-CM

## 2024-03-25 DIAGNOSIS — I10 PRIMARY HYPERTENSION: ICD-10-CM

## 2024-03-25 DIAGNOSIS — S81.002S OPEN WOUND OF LEFT KNEE, SEQUELA: Primary | ICD-10-CM

## 2024-03-25 DIAGNOSIS — E66.01 MORBID OBESITY (H): ICD-10-CM

## 2024-03-25 DIAGNOSIS — I89.0 LYMPHEDEMA: ICD-10-CM

## 2024-03-25 DIAGNOSIS — D62 ANEMIA DUE TO BLOOD LOSS, ACUTE: ICD-10-CM

## 2024-03-25 PROCEDURE — 99316 NF DSCHRG MGMT 30 MIN+: CPT | Performed by: NURSE PRACTITIONER

## 2024-03-25 NOTE — PROGRESS NOTES
[facility] GERIATRICS DISCHARGE SUMMARY  PATIENT'S NAME: Laura Zamudio  YOB: 1960  MEDICAL RECORD NUMBER:  [id number]  Place of Service where encounter took place:  [facility] ([facility]) [[id number]]    PRIMARY CARE PROVIDER AND CLINIC RESPONSIBLE AFTER TRANSFER:   Physician No Ref-Primary, No address on file    Non-FMG Provider     Transferring providers: AGUSTIN Lincoln CNP, Velasquez Roman MD  Recent Hospitalization/ED:  Hospital  [facility]  stay 2/23/2024 to 3/6/2024.  Date of SNF Admission: March 06, 2024  Date of SNF (anticipated) Discharge: March 25, 2024  Discharged to: hotel until she is able to bear more weight and do stairs in her home.   Physical Function:  A1 with 2WW. SBA with bed, dressing and grooming  DME: walker which she ordered.     CODE STATUS/ADVANCE DIRECTIVES DISCUSSION:  Full Code   ALLERGIES: Patient has no known allergies.    NURSING FACILITY COURSE   Laura Zamudio  is a 63 year old  (1960) with a medical history of DVT and PE, HTN and morbid obesity. She fell in January after being pulled down by her dog.  She developed a large hematoma on the left knee which became worse and eventually had developed necrotic skin.  She has been going to the wound clinic every week but not healing well in fact appeared to be worsening.  She was admitted to [facility] where she underwent an I&D with skin graft placement.  She had a wound VAC on several days after surgery but this was removed for discharge.  Recommendations for nonweightbearing on the left lower extremity therefore she [facility] as she was unable to return home as she had 15 stairs to get up to the bathroom. She was admitted to the above facility from  [facility] . Hospital stay 2/27/2024 through 3/6/2024.    Open knee wound, left  Sustained after falling while walking her dog.  Now status post I&D with skin grafting.  No longer has a wound VAC in  place.  She does have an Ace wrap with a knee immobilizer on.  Thankfully not having any complaints of pain.  Completed Levaquin on 3/22/2024.  She did have moderate amount of serosanguineous drainage that came from the graft donor site today so we did not change this after getting permission from the surgeons office.  -- Will continue with Tylenol as needed  -- Will need to call the Ortho clinic for orders on dressing changes.  Laura has the phone number should she continue having drainage     Pulmonary Embolism  DVT  This past summer, unprovoked. Has been on xaretlo since that time.   --continue with xarelto.      Morbid obesity  BMI of 50.0-59.9, adult   With associated hypertension and osteoarthritis  304 lbs 0 oz Body mass index is 55.6 kg/m .  -- Encourage healthy meals and snacks    Lymphedema of both lower extremities  Appears better controlled since starting Lasix.  -- Lymphedema wraps  -- Lasix 20 mg daily    Essential hypertension  Systolic blood pressures have been in the 140-150s. She was on Lotensin which she was not taking during the hospital stay and unfortunately pharmacy does not have it in stock so she has not started here at the TCU.  We started her on Lasix 20 mg daily and although her weights have not changed her edema around the knee area looks significantly improved  -- Continue Lasix 20 mg daily until she follows up with her primary care provider on 3/27/2024  --Continue to monitor blood pressure and heart rate, adjust medications as needed.    Acute blood loss anemia   10.3-->8.2 in the hospital.  Started iron supplement here at the TCU  Hemoglobin   Date Value Ref Range Status   03/20/2024 10.3 (L) 11.7 - 15.7 g/dL Final   03/07/2024 8.4 (L) 11.7 - 15.7 g/dL Final        Hypothyroidism   -- Continue with levothyroxine     Physical deconditioning  Secondary to recent hospitalization and underlying medical conditions.   --ongoing PT/OT for strengthening.     Discharge Medications:  MED REC  "REQUIRED  Post Medication Reconciliation Status: medication reconcilation previously completed during another office visit       Current Outpatient Medications   Medication Sig Dispense Refill    acetaminophen (TYLENOL) 500 MG tablet Take 1,000 mg by mouth every 6 hours as needed for mild pain      glucosamine-chondroitin 500-400 MG CAPS per capsule Take 1 capsule by mouth daily      levothyroxine (SYNTHROID/LEVOTHROID) 125 MCG tablet Take 125 mcg by mouth daily      polyethylene glycol (MIRALAX) 17 g packet Take 17 g by mouth daily      rivaroxaban ANTICOAGULANT (XARELTO) 20 MG TABS tablet Take 20 mg by mouth daily      sennosides (SENOKOT) 8.6 MG tablet Take 1 tablet by mouth 2 times daily         Controlled medications:   not applicable/none     Past Medical History: No past medical history on file.  Physical Exam:   Vitals: /70   Pulse 68   Temp 97.7  F (36.5  C)   Resp 20   Ht 1.575 m (5' 2\")   Wt 141 kg (310 lb 12.8 oz)   SpO2 96%   BMI 56.85 kg/m    BMI: Body mass index is 56.85 kg/m .  GENERAL APPEARANCE:  Alert, in no distress, pleasant, cooperative, oriented x 4  EYES: no discharge or mattering on lids or lashes noted  ENT:  moist mucous membranes, hearing acuity intact  NECK: supple, symmetrical  RESP: no respiratory distress, Lung sounds clear, patient is on room air  CV:  rate and rhythm regular, no murmur. Edema 1-2+ in bilateral lower extremities, lymph wrap in place to right leg, left leg with wrap and immobolizer. VASCULAR: warm extremities without open areas.  ABDOMEN: obese, normal bowel sounds, soft, nontender.  M/S:   Gait and station NWB LLE, no tenderness or swelling of the joints; able to move all extremities   SKIN:  Inspection and palpation of skin and subcutaneous tissue: skin warm, dry and intact without rashes. UTV left knee wound, donor site with small amount of bleeding, frail skin.  No surrounding redness.  NEURO: no facial asymmetry, no speech deficits and able to follow " directions, moves all extremities symmetrically  PSYCH:  insight and judgement intact, memory intact, affect and mood normal    SNF labs:   CBC RESULTS:   Recent Labs   Lab Test 03/20/24  0537 03/07/24  0620   WBC  --  5.6   RBC  --  2.81*   HGB 10.3* 8.4*   HCT  --  27.9*   MCV  --  99   MCH  --  29.9   MCHC  --  30.1*   RDW  --  13.9   PLT  --  373       Last Basic Metabolic Panel:  Recent Labs   Lab Test 03/20/24  0537 03/15/24  1222    142   POTASSIUM 3.5 4.2   CHLORIDE 106 106   VIVIENNE 8.8 9.1   CO2 27 24   BUN 19.6 18.0   CR 0.75 0.71   GLC 86 86     DISCHARGE PLAN:  Follow up labs: No labs orders/due  Medical Follow Up:      Follow up with primary care provider in 1 weeks  [facility] scheduled appointments:  Appointments in Next Year      Mar 25, 2024  9:30 AM  Discharge Summary with AGUSTIN Veronica CNP  [facility] ([facility] ) [phone]           Discharge Services: Out Patient:  physical therapy and occupational therapy  Discharge Instructions Verbalized to Patient at Discharge:   Weight bearing restrictions:  Partial weight bearing (30 - 50%).     TOTAL DISCHARGE TIME:   Greater than 30 minutes  Electronically signed by:  AGUSTIN Lincoln CNP

## 2024-03-25 NOTE — LETTER
3/25/2024        RE: Laura Zamudio  [address] [place] [facility] [address]        [facility] GERIATRICS DISCHARGE SUMMARY  PATIENT'S NAME: Laura Zamudio  YOB: 1960  MEDICAL RECORD NUMBER:  [id number]  Place of Service where encounter took place:  [facility] ([facility]) [[id number]]    PRIMARY CARE PROVIDER AND CLINIC RESPONSIBLE AFTER TRANSFER:   Physician No Ref-Primary, No address on file    Non-FMG Provider     Transferring providers: AGUSTIN Lincoln CNP, Velasquez Roman MD  Recent Hospitalization/ED:  Hospital  [facility]  stay 2/23/2024 to 3/6/2024.  Date of SNF Admission: March 06, 2024  Date of SNF (anticipated) Discharge: March 25, 2024  Discharged to: hotel until she is able to bear more weight and do stairs in her home.   Physical Function:  A1 with 2WW. SBA with bed, dressing and grooming  DME: walker which she ordered.     CODE STATUS/ADVANCE DIRECTIVES DISCUSSION:  Full Code   ALLERGIES: Patient has no known allergies.    NURSING FACILITY COURSE   Laura Zamudio  is a 63 year old  (1960) with a medical history of DVT and PE, HTN and morbid obesity. She fell in January after being pulled down by her dog.  She developed a large hematoma on the left knee which became worse and eventually had developed necrotic skin.  She has been going to the wound clinic every week but not healing well in fact appeared to be worsening.  She was admitted to [facility] where she underwent an I&D with skin graft placement.  She had a wound VAC on several days after surgery but this was removed for discharge.  Recommendations for nonweightbearing on the left lower extremity therefore [facility] as she was unable to return home as she had 15 stairs to get up to the bathroom. She was admitted to the above facility from  [facility] . Hospital stay 2/27/2024 through 3/6/2024.    Open knee wound, left  Sustained after falling  while walking her dog.  Now status post I&D with skin grafting.  No longer has a wound VAC in place.  She does have an Ace wrap with a knee immobilizer on.  Thankfully not having any complaints of pain.  Completed Levaquin on 3/22/2024.  She did have moderate amount of serosanguineous drainage that came from the graft donor site today so we did not change this after getting permission from the surgeons office.  -- Will continue with Tylenol as needed  -- Will need to call the Ortho clinic for orders on dressing changes.  Laura has the phone number should she continue having drainage     Pulmonary Embolism  DVT  This past summer, unprovoked. Has been on xaretlo since that time.   --continue with xarelto.      Morbid obesity  BMI of 50.0-59.9, adult   With associated hypertension and osteoarthritis  304 lbs 0 oz Body mass index is 55.6 kg/m .  -- Encourage healthy meals and snacks    Lymphedema of both lower extremities  Appears better controlled since starting Lasix.  -- Lymphedema wraps  -- Lasix 20 mg daily    Essential hypertension  Systolic blood pressures have been in the 140-150s. She was on Lotensin which she was not taking during the hospital stay and unfortunately pharmacy does not have it in stock so she has not started here at the TCU.  We started her on Lasix 20 mg daily and although her weights have not changed her edema around the knee area looks significantly improved  -- Continue Lasix 20 mg daily until she follows up with her primary care provider on 3/27/2024  --Continue to monitor blood pressure and heart rate, adjust medications as needed.    Acute blood loss anemia   10.3-->8.2 in the hospital.  Started iron supplement here at the TCU  Hemoglobin   Date Value Ref Range Status   03/20/2024 10.3 (L) 11.7 - 15.7 g/dL Final   03/07/2024 8.4 (L) 11.7 - 15.7 g/dL Final        Hypothyroidism   -- Continue with levothyroxine     Physical deconditioning  Secondary to recent hospitalization and underlying  "medical conditions.   --ongoing PT/OT for strengthening.     Discharge Medications:  MED REC REQUIRED  Post Medication Reconciliation Status: medication reconcilation previously completed during another office visit       Current Outpatient Medications   Medication Sig Dispense Refill     acetaminophen (TYLENOL) 500 MG tablet Take 1,000 mg by mouth every 6 hours as needed for mild pain       glucosamine-chondroitin 500-400 MG CAPS per capsule Take 1 capsule by mouth daily       levothyroxine (SYNTHROID/LEVOTHROID) 125 MCG tablet Take 125 mcg by mouth daily       polyethylene glycol (MIRALAX) 17 g packet Take 17 g by mouth daily       rivaroxaban ANTICOAGULANT (XARELTO) 20 MG TABS tablet Take 20 mg by mouth daily       sennosides (SENOKOT) 8.6 MG tablet Take 1 tablet by mouth 2 times daily         Controlled medications:   not applicable/none     Past Medical History: No past medical history on file.  Physical Exam:   Vitals: /70   Pulse 68   Temp 97.7  F (36.5  C)   Resp 20   Ht 1.575 m (5' 2\")   Wt 141 kg (310 lb 12.8 oz)   SpO2 96%   BMI 56.85 kg/m    BMI: Body mass index is 56.85 kg/m .  GENERAL APPEARANCE:  Alert, in no distress, pleasant, cooperative, oriented x 4  EYES: no discharge or mattering on lids or lashes noted  ENT:  moist mucous membranes, hearing acuity intact  NECK: supple, symmetrical  RESP: no respiratory distress, Lung sounds clear, patient is on room air  CV:  rate and rhythm regular, no murmur. Edema 1-2+ in bilateral lower extremities, lymph wrap in place to right leg, left leg with wrap and immobolizer. VASCULAR: warm extremities without open areas.  ABDOMEN: obese, normal bowel sounds, soft, nontender.  M/S:   Gait and station NWB LLE, no tenderness or swelling of the joints; able to move all extremities   SKIN:  Inspection and palpation of skin and subcutaneous tissue: skin warm, dry and intact without rashes. UTV left knee wound, donor site with small amount of bleeding, " frail skin.  No surrounding redness.  NEURO: no facial asymmetry, no speech deficits and able to follow directions, moves all extremities symmetrically  PSYCH:  insight and judgement intact, memory intact, affect and mood normal    SNF labs:   CBC RESULTS:   Recent Labs   Lab Test 03/20/24  0537 03/07/24  0620   WBC  --  5.6   RBC  --  2.81*   HGB 10.3* 8.4*   HCT  --  27.9*   MCV  --  99   MCH  --  29.9   MCHC  --  30.1*   RDW  --  13.9   PLT  --  373       Last Basic Metabolic Panel:  Recent Labs   Lab Test 03/20/24  0537 03/15/24  1222    142   POTASSIUM 3.5 4.2   CHLORIDE 106 106   VIVIENNE 8.8 9.1   CO2 27 24   BUN 19.6 18.0   CR 0.75 0.71   GLC 86 86     DISCHARGE PLAN:  Follow up labs: No labs orders/due  Medical Follow Up:      Follow up with primary care provider in 1 weeks  [facility] scheduled appointments:  Appointments in Next Year      Mar 25, 2024  9:30 AM  Discharge Summary with AGUSTIN Veronica CNP  [facility] ([facility] ) [phone]           Discharge Services: Out Patient:  physical therapy and occupational therapy  Discharge Instructions Verbalized to Patient at Discharge:   Weight bearing restrictions:  Partial weight bearing (30 - 50%).     TOTAL DISCHARGE TIME:   Greater than 30 minutes  Electronically signed by:  AGUSTIN Lincoln CNP                   Sincerely,        AGUSTIN Lincoln CNP